# Patient Record
Sex: FEMALE | Race: WHITE | URBAN - METROPOLITAN AREA
[De-identification: names, ages, dates, MRNs, and addresses within clinical notes are randomized per-mention and may not be internally consistent; named-entity substitution may affect disease eponyms.]

---

## 2018-10-14 ENCOUNTER — EMERGENCY (EMERGENCY)
Facility: HOSPITAL | Age: 58
LOS: 1 days | Discharge: HOME | End: 2018-10-14
Admitting: EMERGENCY MEDICINE
Payer: COMMERCIAL

## 2018-10-14 VITALS
HEIGHT: 62 IN | WEIGHT: 126.1 LBS | RESPIRATION RATE: 18 BRPM | HEART RATE: 85 BPM | SYSTOLIC BLOOD PRESSURE: 118 MMHG | OXYGEN SATURATION: 95 % | TEMPERATURE: 98 F | DIASTOLIC BLOOD PRESSURE: 77 MMHG

## 2018-10-14 DIAGNOSIS — S61.213A LACERATION WITHOUT FOREIGN BODY OF LEFT MIDDLE FINGER WITHOUT DAMAGE TO NAIL, INITIAL ENCOUNTER: ICD-10-CM
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
## 2018-10-14 DIAGNOSIS — Z79.899 OTHER LONG TERM (CURRENT) DRUG THERAPY: ICD-10-CM
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
## 2018-10-14 DIAGNOSIS — S61.217A LACERATION WITHOUT FOREIGN BODY OF LEFT LITTLE FINGER WITHOUT DAMAGE TO NAIL, INITIAL ENCOUNTER: ICD-10-CM

## 2018-10-14 DIAGNOSIS — S61.215A LACERATION WITHOUT FOREIGN BODY OF LEFT RING FINGER WITHOUT DAMAGE TO NAIL, INITIAL ENCOUNTER: ICD-10-CM
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
## 2018-10-14 PROCEDURE — 12001 RPR S/N/AX/GEN/TRNK 2.5CM/<: CPT

## 2018-10-14 PROCEDURE — 99282 EMERGENCY DEPT VISIT SF MDM: CPT | Mod: 25

## 2018-10-14 RX ORDER — LEVOTHYROXINE SODIUM 125 MCG
1 TABLET ORAL
Qty: 0 | Refills: 0 | COMMUNITY

## 2018-10-14 NOTE — ED ADULT NURSE NOTE - CHPI ED NUR SYMPTOMS NEG
no blood in mucus/no chills/no vomiting/no drainage/no purulent drainage/no redness/no bleeding at site/no fever/no rectal pain

## 2018-10-14 NOTE — ED PROVIDER NOTE - OBJECTIVE STATEMENT
56 y/o f presents with lacerations to left 3rd, 4th and 5th digits from knife while cooking today.  Pt denies numbness/tingling to digits, all other ROS negative.  Last tetanus unknown.

## 2018-10-14 NOTE — ED ADULT NURSE NOTE - CHIEF COMPLAINT QUOTE
laceration to right 3,4,5 finger from kitchen knife. pt states she is not up-to-date with tetanus and declines vaccination at this time. denies being on blood thinners.

## 2018-10-14 NOTE — ED PROVIDER NOTE - MEDICAL DECISION MAKING DETAILS
56 y/o f lacerations to left 3rd, 4th and 5th digits; exts NVI, lacs repaired.  Tetanus needs updating, pt refusing, stating she knows risks and declines.  Will d/c with wound care instructions, f/u in 10-14 days for suture removal.

## 2018-10-14 NOTE — ED PROVIDER NOTE - SKIN, MLM
< 1 cm lacerations to volar left 3rd, 4th and 5th phalanges at the PIPJs.  +FROM x 5 digits, strength 5/5, sensation intact distally

## 2018-10-14 NOTE — ED ADULT NURSE NOTE - OBJECTIVE STATEMENT
PT came to ED complaining of lacerations to right palmar side 3rd, 4th, 5th fingers. Pt states she cut her hand with kitchen knife while cooking dinner. No active bleeding found at this time. Positive PMS x4 extremities. Pt is alert and oriented x3. Pt denies all other medical complaints or injuries at this time. Wounds are clean and dry.

## 2018-10-14 NOTE — ED ADULT TRIAGE NOTE - CHIEF COMPLAINT QUOTE
laceration to right 2,3,4 finger from kitchen knife. pt states she is not up-to-date with tetanus and declines vaccination at this time. denies being on blood thinners. laceration to right 3,4,5 finger from kitchen knife. pt states she is not up-to-date with tetanus and declines vaccination at this time. denies being on blood thinners.

## 2018-10-19 PROBLEM — E03.9 HYPOTHYROIDISM, UNSPECIFIED: Chronic | Status: ACTIVE | Noted: 2018-10-14

## 2018-10-20 PROBLEM — Z00.00 ENCOUNTER FOR PREVENTIVE HEALTH EXAMINATION: Status: ACTIVE | Noted: 2018-10-20

## 2018-10-26 ENCOUNTER — APPOINTMENT (OUTPATIENT)
Dept: ORTHOPEDIC SURGERY | Facility: CLINIC | Age: 58
End: 2018-10-26
Payer: COMMERCIAL

## 2018-10-26 VITALS — HEIGHT: 63 IN | BODY MASS INDEX: 22.32 KG/M2 | RESPIRATION RATE: 16 BRPM | WEIGHT: 126 LBS

## 2018-10-26 DIAGNOSIS — Z78.9 OTHER SPECIFIED HEALTH STATUS: ICD-10-CM

## 2018-10-26 DIAGNOSIS — Z86.39 PERSONAL HISTORY OF OTHER ENDOCRINE, NUTRITIONAL AND METABOLIC DISEASE: ICD-10-CM

## 2018-10-26 PROCEDURE — 20551 NJX 1 TENDON ORIGIN/INSJ: CPT | Mod: F5

## 2018-10-26 PROCEDURE — 99203 OFFICE O/P NEW LOW 30 MIN: CPT | Mod: 25

## 2018-10-26 PROCEDURE — 73140 X-RAY EXAM OF FINGER(S): CPT | Mod: F5

## 2018-10-26 RX ORDER — LEVOTHYROXINE SODIUM 50 UG/1
50 TABLET ORAL
Refills: 0 | Status: ACTIVE | COMMUNITY

## 2018-11-02 NOTE — ED ADULT NURSE NOTE - NSIMPLEMENTINTERV_GEN_ALL_ED
Patient/Caregiver provided printed discharge information.
Implemented All Universal Safety Interventions:  [place] to call system. Call bell, personal items and telephone within reach. Instruct patient to call for assistance. Room bathroom lighting operational. Non-slip footwear when patient is off stretcher. Physically safe environment: no spills, clutter or unnecessary equipment. Stretcher in lowest position, wheels locked, appropriate side rails in place.

## 2019-08-06 ENCOUNTER — APPOINTMENT (OUTPATIENT)
Dept: ORTHOPEDIC SURGERY | Facility: CLINIC | Age: 59
End: 2019-08-06
Payer: COMMERCIAL

## 2019-08-06 VITALS — BODY MASS INDEX: 22.32 KG/M2 | RESPIRATION RATE: 16 BRPM | HEIGHT: 63 IN | WEIGHT: 126 LBS

## 2019-08-06 PROCEDURE — 99214 OFFICE O/P EST MOD 30 MIN: CPT | Mod: 25

## 2019-08-06 PROCEDURE — 20550 NJX 1 TENDON SHEATH/LIGAMENT: CPT | Mod: RT

## 2020-07-15 ENCOUNTER — APPOINTMENT (OUTPATIENT)
Dept: ORTHOPEDIC SURGERY | Facility: CLINIC | Age: 60
End: 2020-07-15
Payer: COMMERCIAL

## 2020-07-15 VITALS — BODY MASS INDEX: 22.32 KG/M2 | RESPIRATION RATE: 16 BRPM | WEIGHT: 126 LBS | HEIGHT: 63 IN

## 2020-07-15 DIAGNOSIS — M65.311 TRIGGER THUMB, RIGHT THUMB: ICD-10-CM

## 2020-07-15 PROCEDURE — 20550 NJX 1 TENDON SHEATH/LIGAMENT: CPT | Mod: F5

## 2020-07-15 PROCEDURE — 99214 OFFICE O/P EST MOD 30 MIN: CPT | Mod: 25

## 2021-01-15 ENCOUNTER — TRANSCRIPTION ENCOUNTER (OUTPATIENT)
Age: 61
End: 2021-01-15

## 2021-02-12 ENCOUNTER — TRANSCRIPTION ENCOUNTER (OUTPATIENT)
Age: 61
End: 2021-02-12